# Patient Record
Sex: FEMALE | URBAN - METROPOLITAN AREA
[De-identification: names, ages, dates, MRNs, and addresses within clinical notes are randomized per-mention and may not be internally consistent; named-entity substitution may affect disease eponyms.]

---

## 2024-11-05 ENCOUNTER — APPOINTMENT (OUTPATIENT)
Dept: URBAN - METROPOLITAN AREA CLINIC 193 | Age: 44
Setting detail: DERMATOLOGY
End: 2024-11-07

## 2024-11-05 DIAGNOSIS — L82.1 OTHER SEBORRHEIC KERATOSIS: ICD-10-CM

## 2024-11-05 DIAGNOSIS — L81.4 OTHER MELANIN HYPERPIGMENTATION: ICD-10-CM

## 2024-11-05 DIAGNOSIS — L57.8 OTHER SKIN CHANGES DUE TO CHRONIC EXPOSURE TO NONIONIZING RADIATION: ICD-10-CM

## 2024-11-05 DIAGNOSIS — D22 MELANOCYTIC NEVI: ICD-10-CM

## 2024-11-05 DIAGNOSIS — Z71.89 OTHER SPECIFIED COUNSELING: ICD-10-CM

## 2024-11-05 PROBLEM — D22.61 MELANOCYTIC NEVI OF RIGHT UPPER LIMB, INCLUDING SHOULDER: Status: ACTIVE | Noted: 2024-11-05

## 2024-11-05 PROCEDURE — OTHER COUNSELING: OTHER

## 2024-11-05 PROCEDURE — OTHER MIPS QUALITY: OTHER

## 2024-11-05 PROCEDURE — 99203 OFFICE O/P NEW LOW 30 MIN: CPT

## 2024-11-05 ASSESSMENT — LOCATION DETAILED DESCRIPTION DERM
LOCATION DETAILED: LEFT PROXIMAL PRETIBIAL REGION
LOCATION DETAILED: LEFT INFERIOR ANTERIOR NECK
LOCATION DETAILED: LEFT MEDIAL SUPERIOR CHEST
LOCATION DETAILED: RIGHT ANTERIOR PROXIMAL UPPER ARM

## 2024-11-05 ASSESSMENT — LOCATION ZONE DERM
LOCATION ZONE: ARM
LOCATION ZONE: LEG
LOCATION ZONE: TRUNK
LOCATION ZONE: NECK

## 2024-11-05 ASSESSMENT — LOCATION SIMPLE DESCRIPTION DERM
LOCATION SIMPLE: RIGHT UPPER ARM
LOCATION SIMPLE: LEFT ANTERIOR NECK
LOCATION SIMPLE: CHEST
LOCATION SIMPLE: LEFT PRETIBIAL REGION

## 2024-11-05 NOTE — HPI: EVALUATION OF SKIN LESION(S)
How Severe Are Your Spot(S)?: moderate
PHYSICIAN NEXT STEPS:  Review Only    CHIEF COMPLAINT:  Chief Complaint/Protocol Used: Earache (A)  Onset: A couple weeks ago       ASSESSMENT:  ? Onset: A couple weeks ago   ? Normal True  ? Location: Right ear to upper and lower jaw  ? Onset: A couple weeks ago , comes and goes  ? Severity: severe, taking tylenol.   ? Uri Symptoms: On antibiotics for uti.(Keflex)  ? Fever: Denies  ? Other Symptoms: No swelling, painful to touch   -------------------------------------------------------    DISPOSITION:  Disposition Recommendation: See HCP (or PCP Triage) Within 4 Hours  Questions that led to disposition:  ? [1] SEVERE pain AND [2] not improved 2 hours after taking analgesic medication (e.g., ibuprofen or acetaminophen)  Patient Directed To: Unspecified  Patient Intended Action: Unspecified    CALL NOTES:  09/02/2021 at 9:20 PM by Lissa Mcgill  ? Patient declined ED tonight, [facility] are closed at this time, unable to make an appointment with doctors office tomorrow because she has to work. Advised [facility] tomorrow for evaluation of symptoms.     DISPOSITION OVERRIDE/PROVIDER CONSULT:  Disposition Override: N/A  Override Source: Unspecified  Consulted with PCP: No  Consulted with On-Call Physician: No    CALLER CONTACT INFO:  Name: Meghna Ochoa (Self)  Phone 1: [phone] (Home Phone) - Preferred      ENCOUNTER STARTED:  09/02/21 09:08:22 PM  ENCOUNTER ASSIGNED TO/CLOSED BY:  Lissa Mcgill @ 09/02/21 09:21:32 PM      -------------------------------------------------------    CARE ADVICE given per Earache (A) guideline.  SEE HCP WITHIN 4 HOURS (OR PCP TRIAGE):  * IF OFFICE WILL BE OPEN: You need to be seen within the next 3 or 4 hours. Call your doctor (or NP/PA) now or as soon as the office opens.  * IF OFFICE WILL BE CLOSED AND NO PCP (PRIMARY CARE PROVIDER) SECOND-LEVEL TRIAGE: You need to be seen within the next 3 or 4 hours. A nearby Urgent Care Center (U[facility]) is often a good source of care. Another choice is 
Hpi Title: Evaluation of Skin Lesions
to go to the ED. Go sooner if you   become worse.  * IF OFFICE WILL BE CLOSED AND PCP SECOND-LEVEL TRIAGE REQUIRED: You may need to be seen. Your doctor (or NP/PA) will want to talk with you to decide what's best. I'll page the on-call provider now. If you haven't heard from the provider (or me) within   30 minutes, call again. NOTE: If on-call provider can't be reached, send to UCC or ED.    NOTE TO TRIAGER:  * Use nurse judgment to select the most appropriate source of care.  * Consider both the urgency of the patient's symptoms AND what resources may be needed to evaluate and manage the patient.    SOURCES OF CARE:  * ED: Patients who may need surgery or hospital admission need to be sent to an ED. So do most patients with serious symptoms or complex medical problems.  * UCC: Some UCCs can manage patients who are stable and have less serious symptoms (e.g., minor illnesses and injuries). The triager must know the UCC capabilities before sending a patient there. If unsure, call ahead.  * OFFICE: If patient sounds stable and not seriously ill, consult PCP (or follow your office policy) to see if patient can be seen NOW in office.; PAIN MEDICINES:  * For pain relief, you can take either acetaminophen, ibuprofen, or naproxen.  * They are over-the-counter (OTC) pain drugs. You can buy them at the drugstore.  * ACETAMINOPHEN - REGULAR STRENGTH TYLENOL: Take 650 mg (two 325 mg pills) by mouth every 4 to 6 hours as needed. Each Regular Strength Tylenol pill has 325 mg of acetaminophen. The most you should take each day is 3,250 mg (10 pills a day).  * ACETAMINOPHEN - EXTRA STRENGTH TYLENOL: Take 1,000 mg (two 500 mg pills) every 8 hours as needed. Each Extra Strength Tylenol pill has 500 mg of acetaminophen. The most you should take each day is 3,000 mg (6 pills a day).  * IBUPROFEN (E.G., MOTRIN, ADVIL): Take 400 mg (two 200 mg pills) by mouth every 6 hours. The most you should take each day is 1,200 mg (six 200 mg 
Additional History: Patient presents today for a TBSE. No HX of skin cancer. Patient would like all moles to be evaluated.
pills), unless your doctor has told you to take more.  * NAPROXEN (E.G., ALEVE): Take 220 mg (one 220 mg pill) by mouth every 8 to 12 hours as needed. You may take 440 mg (two 220 mg pills) for your first dose. The most you should take each day is 660 mg (three 220 mg pills a day), unless your doctor has   told you to take more.      UNDERSTANDS CARE ADVICE: Yes    AGREES WITH CARE ADVICE: Yes    WILL FOLLOW CARE ADVICE: Yes    -------------------------------------------------------